# Patient Record
Sex: FEMALE | Race: WHITE | Employment: OTHER | URBAN - METROPOLITAN AREA
[De-identification: names, ages, dates, MRNs, and addresses within clinical notes are randomized per-mention and may not be internally consistent; named-entity substitution may affect disease eponyms.]

---

## 2017-03-08 PROBLEM — Z95.0 S/P PLACEMENT OF CARDIAC PACEMAKER: Status: ACTIVE | Noted: 2017-03-08

## 2017-03-28 ENCOUNTER — HOSPITAL ENCOUNTER (EMERGENCY)
Age: 82
Discharge: HOME | DRG: 379 | End: 2017-03-28
Attending: EMERGENCY MEDICINE
Payer: MEDICARE

## 2017-03-28 VITALS
BODY MASS INDEX: 21.22 KG/M2 | SYSTOLIC BLOOD PRESSURE: 103 MMHG | TEMPERATURE: 99.3 F | HEIGHT: 68 IN | DIASTOLIC BLOOD PRESSURE: 51 MMHG | WEIGHT: 140 LBS | RESPIRATION RATE: 16 BRPM | HEART RATE: 78 BPM | OXYGEN SATURATION: 98 %

## 2017-03-28 DIAGNOSIS — R19.7 VOMITING AND DIARRHEA: Primary | ICD-10-CM

## 2017-03-28 DIAGNOSIS — R11.10 VOMITING AND DIARRHEA: Primary | ICD-10-CM

## 2017-03-28 LAB
ALBUMIN SERPL BCP-MCNC: 3.4 G/DL (ref 3.2–4.6)
ALBUMIN/GLOB SERPL: 1 {RATIO} (ref 1.2–3.5)
ALP SERPL-CCNC: 58 U/L (ref 50–136)
ALT SERPL-CCNC: 16 U/L (ref 12–65)
ANION GAP BLD CALC-SCNC: 7 MMOL/L (ref 7–16)
AST SERPL W P-5'-P-CCNC: 13 U/L (ref 15–37)
BACTERIA URNS QL MICRO: 0 /HPF
BASOPHILS # BLD AUTO: 0 K/UL (ref 0–0.2)
BASOPHILS # BLD: 0 % (ref 0–2)
BILIRUB SERPL-MCNC: 0.7 MG/DL (ref 0.2–1.1)
BUN SERPL-MCNC: 21 MG/DL (ref 8–23)
CALCIUM SERPL-MCNC: 8.3 MG/DL (ref 8.3–10.4)
CASTS URNS QL MICRO: 0 /LPF
CHLORIDE SERPL-SCNC: 108 MMOL/L (ref 98–107)
CO2 SERPL-SCNC: 28 MMOL/L (ref 21–32)
CREAT SERPL-MCNC: 1.63 MG/DL (ref 0.6–1)
CRYSTALS URNS QL MICRO: 0 /LPF
DIFFERENTIAL METHOD BLD: ABNORMAL
EOSINOPHIL # BLD: 0 K/UL (ref 0–0.8)
EOSINOPHIL NFR BLD: 0 % (ref 0.5–7.8)
EPI CELLS #/AREA URNS HPF: NORMAL /HPF
ERYTHROCYTE [DISTWIDTH] IN BLOOD BY AUTOMATED COUNT: 14.4 % (ref 11.9–14.6)
GLOBULIN SER CALC-MCNC: 3.4 G/DL (ref 2.3–3.5)
GLUCOSE SERPL-MCNC: 106 MG/DL (ref 65–100)
HCT VFR BLD AUTO: 34.5 % (ref 35.8–46.3)
HGB BLD-MCNC: 11.2 G/DL (ref 11.7–15.4)
IMM GRANULOCYTES # BLD: 0 K/UL (ref 0–0.5)
IMM GRANULOCYTES NFR BLD AUTO: 0.2 % (ref 0–5)
LIPASE SERPL-CCNC: 82 U/L (ref 73–393)
LYMPHOCYTES # BLD AUTO: 9 % (ref 13–44)
LYMPHOCYTES # BLD: 0.6 K/UL (ref 0.5–4.6)
MCH RBC QN AUTO: 28.4 PG (ref 26.1–32.9)
MCHC RBC AUTO-ENTMCNC: 32.5 G/DL (ref 31.4–35)
MCV RBC AUTO: 87.3 FL (ref 79.6–97.8)
MONOCYTES # BLD: 0.4 K/UL (ref 0.1–1.3)
MONOCYTES NFR BLD AUTO: 6 % (ref 4–12)
MUCOUS THREADS URNS QL MICRO: 0 /LPF
NEUTS SEG # BLD: 5.6 K/UL (ref 1.7–8.2)
NEUTS SEG NFR BLD AUTO: 85 % (ref 43–78)
OTHER OBSERVATIONS,UCOM: NORMAL
PLATELET # BLD AUTO: 180 K/UL (ref 150–450)
PMV BLD AUTO: 9.7 FL (ref 10.8–14.1)
POTASSIUM SERPL-SCNC: 3.9 MMOL/L (ref 3.5–5.1)
PROT SERPL-MCNC: 6.8 G/DL (ref 6.3–8.2)
RBC # BLD AUTO: 3.95 M/UL (ref 4.05–5.25)
RBC #/AREA URNS HPF: NORMAL /HPF
SODIUM SERPL-SCNC: 143 MMOL/L (ref 136–145)
WBC # BLD AUTO: 6.6 K/UL (ref 4.3–11.1)
WBC URNS QL MICRO: NORMAL /HPF

## 2017-03-28 RX ORDER — HYOSCYAMINE SULFATE 0.12 MG/1
0.25 TABLET SUBLINGUAL
Status: COMPLETED | OUTPATIENT
Start: 2017-03-28 | End: 2017-03-28

## 2017-03-28 RX ORDER — SODIUM CHLORIDE 9 MG/ML
125 INJECTION, SOLUTION INTRAVENOUS CONTINUOUS
Status: DISCONTINUED | OUTPATIENT
Start: 2017-03-28 | End: 2017-03-28 | Stop reason: HOSPADM

## 2017-03-28 RX ORDER — HYOSCYAMINE SULFATE 0.12 MG/1
0.12 TABLET SUBLINGUAL
Qty: 15 TAB | Refills: 0 | Status: SHIPPED | OUTPATIENT
Start: 2017-03-28 | End: 2017-07-28

## 2017-03-28 RX ORDER — ONDANSETRON 4 MG/1
4 TABLET, ORALLY DISINTEGRATING ORAL
Status: COMPLETED | OUTPATIENT
Start: 2017-03-28 | End: 2017-03-28

## 2017-03-28 RX ORDER — SODIUM CHLORIDE 0.9 % (FLUSH) 0.9 %
5-10 SYRINGE (ML) INJECTION EVERY 8 HOURS
Status: DISCONTINUED | OUTPATIENT
Start: 2017-03-28 | End: 2017-03-28 | Stop reason: HOSPADM

## 2017-03-28 RX ORDER — ONDANSETRON 4 MG/1
4 TABLET, ORALLY DISINTEGRATING ORAL
Qty: 6 TAB | Refills: 1 | Status: ON HOLD | OUTPATIENT
Start: 2017-03-28 | End: 2017-03-29

## 2017-03-28 RX ORDER — SODIUM CHLORIDE 0.9 % (FLUSH) 0.9 %
5-10 SYRINGE (ML) INJECTION AS NEEDED
Status: DISCONTINUED | OUTPATIENT
Start: 2017-03-28 | End: 2017-03-28 | Stop reason: HOSPADM

## 2017-03-28 RX ADMIN — SODIUM CHLORIDE 1000 ML: 900 INJECTION, SOLUTION INTRAVENOUS at 17:58

## 2017-03-28 RX ADMIN — ONDANSETRON 4 MG: 4 TABLET, ORALLY DISINTEGRATING ORAL at 17:58

## 2017-03-28 RX ADMIN — HYOSCYAMINE SULFATE 0.25 MG: 0.12 TABLET ORAL at 17:59

## 2017-03-28 NOTE — ED NOTES
Pt comes to ED complaining of N/V/D that began over night last evening and has continued through today.

## 2017-03-28 NOTE — ED TRIAGE NOTES
Patient states nausea vomiting diarrhea since this am. States temperature at home 101. Patient has had imodium at [address] and pepto x2 doses since 0900. States mid abdominal pain.

## 2017-03-29 ENCOUNTER — HOSPITAL ENCOUNTER (INPATIENT)
Age: 82
LOS: 2 days | Discharge: HOME | DRG: 379 | End: 2017-03-31
Attending: INTERNAL MEDICINE | Admitting: INTERNAL MEDICINE
Payer: MEDICARE

## 2017-03-29 PROBLEM — K92.2 GI BLEED: Status: ACTIVE | Noted: 2017-03-29

## 2017-03-29 LAB
ANION GAP BLD CALC-SCNC: 11 MMOL/L (ref 7–16)
BUN SERPL-MCNC: 29 MG/DL (ref 8–23)
CALCIUM SERPL-MCNC: 7.8 MG/DL (ref 8.3–10.4)
CHLORIDE SERPL-SCNC: 106 MMOL/L (ref 98–107)
CO2 SERPL-SCNC: 24 MMOL/L (ref 21–32)
CREAT SERPL-MCNC: 1.73 MG/DL (ref 0.6–1)
GLUCOSE SERPL-MCNC: 73 MG/DL (ref 65–100)
HCT VFR BLD AUTO: 33 % (ref 35.8–46.3)
HGB BLD-MCNC: 10.7 G/DL (ref 11.7–15.4)
POTASSIUM SERPL-SCNC: 3.6 MMOL/L (ref 3.5–5.1)
SODIUM SERPL-SCNC: 141 MMOL/L (ref 136–145)

## 2017-03-29 PROCEDURE — 74011250637 HC RX REV CODE- 250/637

## 2017-03-29 PROCEDURE — 85018 HEMOGLOBIN: CPT

## 2017-03-29 PROCEDURE — C9113 INJ PANTOPRAZOLE SODIUM, VIA: HCPCS

## 2017-03-29 PROCEDURE — 36415 COLL VENOUS BLD VENIPUNCTURE: CPT

## 2017-03-29 PROCEDURE — 80048 BASIC METABOLIC PNL TOTAL CA: CPT

## 2017-03-29 PROCEDURE — 74011250636 HC RX REV CODE- 250/636

## 2017-03-29 PROCEDURE — 65270000029 HC RM PRIVATE

## 2017-03-29 PROCEDURE — 74011000250 HC RX REV CODE- 250

## 2017-03-29 PROCEDURE — 99218 HC RM OBSERVATION: CPT

## 2017-03-29 RX ORDER — ACETAMINOPHEN 325 MG/1
325 TABLET ORAL
Status: DISCONTINUED | OUTPATIENT
Start: 2017-03-29 | End: 2017-03-31 | Stop reason: HOSPADM

## 2017-03-29 RX ORDER — LISINOPRIL 20 MG/1
20 TABLET ORAL DAILY
Status: DISCONTINUED | OUTPATIENT
Start: 2017-03-30 | End: 2017-03-31 | Stop reason: HOSPADM

## 2017-03-29 RX ORDER — ONDANSETRON 4 MG/1
4 TABLET, ORALLY DISINTEGRATING ORAL
Status: DISCONTINUED | OUTPATIENT
Start: 2017-03-29 | End: 2017-03-31 | Stop reason: HOSPADM

## 2017-03-29 RX ORDER — ATENOLOL 25 MG/1
50 TABLET ORAL DAILY
Status: DISCONTINUED | OUTPATIENT
Start: 2017-03-30 | End: 2017-03-31 | Stop reason: HOSPADM

## 2017-03-29 RX ORDER — FAMOTIDINE 20 MG/1
20 TABLET, FILM COATED ORAL DAILY
Status: DISCONTINUED | OUTPATIENT
Start: 2017-03-30 | End: 2017-03-29

## 2017-03-29 RX ORDER — POTASSIUM CHLORIDE 750 MG/1
10 TABLET, FILM COATED, EXTENDED RELEASE ORAL 2 TIMES DAILY
COMMUNITY
End: 2017-04-01

## 2017-03-29 RX ORDER — TRIAMTERENE AND HYDROCHLOROTHIAZIDE 37.5; 25 MG/1; MG/1
1 CAPSULE ORAL DAILY
COMMUNITY
End: 2017-03-31

## 2017-03-29 RX ORDER — LEVOTHYROXINE SODIUM 112 UG/1
112 TABLET ORAL
Status: DISCONTINUED | OUTPATIENT
Start: 2017-03-30 | End: 2017-03-31 | Stop reason: HOSPADM

## 2017-03-29 RX ORDER — MIRTAZAPINE 15 MG/1
7.5 TABLET, FILM COATED ORAL
Status: DISCONTINUED | OUTPATIENT
Start: 2017-03-29 | End: 2017-03-31 | Stop reason: HOSPADM

## 2017-03-29 RX ORDER — HYOSCYAMINE SULFATE 0.12 MG/1
0.12 TABLET SUBLINGUAL
Status: DISCONTINUED | OUTPATIENT
Start: 2017-03-29 | End: 2017-03-31 | Stop reason: HOSPADM

## 2017-03-29 RX ADMIN — SODIUM CHLORIDE 40 MG: 9 INJECTION INTRAMUSCULAR; INTRAVENOUS; SUBCUTANEOUS at 22:36

## 2017-03-29 RX ADMIN — MIRTAZAPINE 7.5 MG: 15 TABLET, FILM COATED ORAL at 22:36

## 2017-03-29 NOTE — PROGRESS NOTES
Spiritual Care Assessment/Progress Notes    Malik Ayon [id number]  [id number]    12/14/1926  80 y.o.  female    Patient Telephone Number: [phone] (home)   Christianity Affiliation: Roman Catholic   Language: English   Extended Emergency Contact Information  Primary Emergency Contact: Rob Parr Phone: [phone]  Relation: Other Relative  Secondary Emergency Contact: Rob Parr Phone: [phone]  Relation: Son   Patient Active Problem List    Diagnosis Date Noted    GI bleed 03/29/2017    S/P placement of cardiac pacemaker 03/08/2017    Slow transit constipation 12/20/2016    Vasomotor rhinitis 12/20/2016    History of TIA (transient ischemic attack) 12/20/2016    Nonrheumatic aortic valve stenosis 12/20/2016    Gait instability 12/20/2016    Hypothyroidism, adult     HTN (hypertension), benign     GERD (gastroesophageal reflux disease)     Paroxysmal atrial fibrillation ([facility] [address]         Date: 3/29/2017       Level of Christianity/Spiritual Activity:  [x]         Involved in geovanna tradition/spiritual practice    []         Not involved in geovanna tradition/spiritual practice  [x]         Spiritually oriented    []         Claims no spiritual orientation    []         seeking spiritual identity  []         Feels alienated from Sabianist practice/tradition  []         Feels angry about Sabianist practice/tradition  [x]         Spirituality/Sabianist tradition is a resource for coping at this time.   []         Not able to assess due to medical condition    Services Provided Today:  []         crisis intervention    [x]         reading Scriptures  []         spiritual assessment    [x]         prayer  [x]         empathic listening/emotional support  []         rites and rituals (cite in comments)  []         life review     []         Sabianist support  []         theological development   []         advocacy  []         ethical dialog     []         blessing  []         bereavement support    [x]         support to family  []         anticipatory grief support   []         help with AMD  []         spiritual guidance    []         meditation      Spiritual Care Needs  [x]         Emotional Support  [x]         Spiritual/Buddhist Care  []         Loss/Adjustment  []         Advocacy/Referral                /Ethics  []         No needs expressed at               this time  []         Other: (note in               comments)  [address]  [x]         Follow up visits with               pt/family  []         Provide materials  []         Schedule sacraments  []         Contact Community               Clergy  []         Follow up as needed  []         Other: (note in               comments)     Comments: Patient doing well; son and daughter-in-law tearful, but coping appropriately. Family is Jewish and request Nikole Robert for [employer]. Support, including prayer, provided.     Purvi Zambrano

## 2017-03-29 NOTE — CONSULTS
[facility] Consult Note       Referring Physician:  Dr Jaki Bruce Date:  3/29/2017    Admit Date:  3/29/2017    Chief Complaint:  Melena, anemia    Subjective:     History of Present Illness:  Patient is a 80 y.o. female with PMH of GERD, HTN, A fib not on anti-coagulation secondary to GI bleed about a year ago, who is seen in consultation at the request of Dr. Monroe Blake for anemia in setting of guaiac positive dark stool. Most of the history obtained from son- Tennille Client at bedside. Ms Rupesh Hopkins presents with 2 days of nausea and bilious vomiting followed by 3-4 episodes of dark liquid BMs today. She was seen in the ER yesterday and given IVF and discharged. Today she saw her PMD and was sent for admission secondary to melena on exam.  She denies NSAIDs, aspirin use. She used to be on coumadin but a year ago she had significant obscure GI bleed that required blood tranfusions and EGD/[place] did not reveal source of bleeding. She never had a capsule endoscopy and coumadin was discontinued. After that hopsitalizaiton she had significant deconditioning and was on hospice for several months. She continued to improve and came off hospice late 2016. She now lives in [place] with her son and daughter-in-law. She has had decreased appetite for some time but denies significant abdominal pain. She has mild bilateral lower quadrant pain today.       PMH:  Past Medical History:   Diagnosis Date    Arrhythmia     Gait instability 12/20/2016    GERD (gastroesophageal reflux disease)     History of TIA (transient ischemic attack) 12/20/2016    HTN (hypertension), benign     Hypothyroidism, adult     Nonrheumatic aortic valve stenosis 12/20/2016    Paroxysmal atrial fibrillation (HCC)     S/P placement of cardiac pacemaker 3/8/2017    Slow transit constipation 12/20/2016    Vasomotor rhinitis 12/20/2016       PSH:  Past Surgical History:   Procedure Laterality Date    CARDIAC SURG PROCEDURE UNLIST      pace maker    HX APPENDECTOMY      HX CHOLECYSTECTOMY      HX HEENT      thyroid       Allergies: Allergies   Allergen Reactions    Pcn [Penicillins] Anaphylaxis    Sulfa (Sulfonamide Antibiotics) Anaphylaxis       Home Medications:  Prior to Admission medications    Medication Sig Start Date End Date Taking? Authorizing Provider   triamterene-hydroCHLOROthiazide (DYAZIDE) 37.5-25 mg per capsule Take 1 Cap by mouth daily. Yes Historical Provider   potassium chloride SR (KLOR-CON 10) 10 mEq tablet Take 10 mEq by mouth two (2) times a day. Historical Provider   hyoscyamine SL (LEVSIN/SL) 0.125 mg SL tablet 1 Tab by SubLINGual route every four (4) hours as needed for Cramping. 3/28/17   David Greenberg MD   atenolol (TENORMIN) 50 mg tablet Take  by mouth daily. Historical Provider   levothyroxine (SYNTHROID) 112 mcg tablet Take  by mouth Daily (before breakfast). Historical Provider   lisinopril (PRINIVIL, ZESTRIL) 20 mg tablet Take  by mouth daily. Historical Provider   senna (SENNA) 8.6 mg tablet Take 1 Tab by mouth daily. Historical Provider   raNITIdine (ZANTAC) 150 mg tablet Take 150 mg by mouth two (2) times a day. Historical Provider   acetaminophen (TYLENOL) 325 mg tablet Take  by mouth every four (4) hours as needed for Pain.     Historical Provider       Hospital Medications:  Current Facility-Administered Medications   Medication Dose Route Frequency    hyoscyamine SL (LEVSIN/SL) tablet 0.125 mg  0.125 mg SubLINGual Q4H PRN    ondansetron (ZOFRAN ODT) tablet 4 mg  4 mg Oral Q8H PRN    mirtazapine (REMERON) tablet 7.5 mg  7.5 mg Oral QHS    acetaminophen (TYLENOL) tablet 325 mg  325 mg Oral Q4H PRN    [START ON 3/30/2017] atenolol (TENORMIN) tablet 50 mg  50 mg Oral DAILY    [START ON 3/30/2017] levothyroxine (SYNTHROID) tablet 112 mcg  112 mcg Oral ACB    [START ON 3/30/2017] lisinopril (PRINIVIL, ZESTRIL) tablet 20 mg  20 mg Oral DAILY    [START ON 3/30/2017] famotidine (PEPCID) tablet 20 mg  20 mg Oral DAILY    pantoprazole (PROTONIX) 40 mg in sodium chloride 0.9 % 10 mL injection  40 mg IntraVENous Q12H       Social History:  Social History   Substance Use Topics    Smoking status: Never Smoker    Smokeless tobacco: Never Used    Alcohol use Yes      Comment: occ. Pt denies any history of IV drug use, blood transfusions, or tattoos. Family History:  No family history on file.   + Fhx of CRC in brother and son (doing well after surgery)    Review of Systems:  A detailed 10 system ROS is obtained, with pertinent positives as listed above. All others are negative. Diet: NPO      Objective:     Physical Exam:  Vitals:  Visit Vitals    /54 (BP Patient Position: Standing)    Pulse 69    Temp 98.9 °F (37.2 °C)    Resp 18    SpO2 96%     Gen:  Pt is alert, cooperative, no acute distress - elderly  Skin:  Extremities and face reveal no rashes. No bermeo erythema. No telangiectasias on the chest wall. HEENT: Sclerae anicteric. Extra-occular muscles are intact. No oral ulcers. No abnormal pigmentation of the lips. The neck is supple. Cardiovascular: Regular rate and rhythm. No murmurs, gallops, or rubs. Respiratory:  Comfortable breathing with no accessory muscle use. Clear breath sounds anteriorly with no wheezes, rales, or rhonchi. GI:  Abdomen nondistended, soft, and mild tenderness to palpation in b/l lower quadrant. Normal active bowel sounds. No enlargement of the liver or spleen. No masses palpable. Rectal:  Deferred  Musculoskeletal:  No pitting edema of the lower legs. Extremities have good range of motion. No costovertebral tenderness. Neurological:  Gross memory appears intact. Patient is alert and oriented. Psychiatric:  Mood appears appropriate with judgement intact. Lymphatic:  No cervical or supraclavicular adenopathy.     Laboratory:    Recent Labs      03/29/17   1510  03/28/17   1530   WBC   --   6.6   HGB 10.7*  11.2*   HCT  33.0*  34.5*   PLT   --   180   MCV   --   87.3   NA  141  143   K  3.6  3.9   CL  106  108*   CO2  24  28   BUN  29*  21   CREA  1.73*  1.63*   CA  7.8*  8.3   GLU  73  106*   AP   --   58   SGOT   --   13*   ALT   --   16   TBILI   --   0.7   ALB   --   3.4   TP   --   6.8   LPSE   --   82          Assessment:     Principal Problem:    GI bleed (3/29/2017)        Plan:   80 yr old female with GERD, hypothyroidism, HTN, TIA, A fib admitted from PCP for guaiac positive stool in the setting of melena which could be secondary to PUD, esophagitis, as well as malignancy. The latter is less likely given she had EGD/[place] about a year ago in [place]. Differential diagnosis also includes right sided diverticular bleed as well as infectious gastroenteritis and SB AVMs. Hb stable but decreased from yesterday. Agree with H/H q and transfuse if Hb <9 given age and cardiac history  PPI IV twice daily  EGD in am - IRBA d/w patient and son and they are agreeable.         Esme Jose MD  [facility]

## 2017-03-29 NOTE — ED PROVIDER NOTES
HPI Comments: 77-year-old lady with a history of vomiting and diarrhea that started last night. Patient had apparently been doing well yesterday and then late last night she began having some nonbloody nonbilious emesis and nonbloody diarrhea. She said she did feel a little dehydrated and fatigued so the family gave her some Imodium which did not seem to help. Patient said she is not having any abdominal pain with this and has had no chest pain or shortness of breath. Patient said that she does have some muscle achiness that she describes as a 6 out of 10. She says nothing has really made her symptoms any worse. Elements of this note were created using speech recognition software. As such, errors of speech recognition may be present. Patient is a 80 y.o. female presenting with abdominal pain. The history is provided by the patient. Abdominal Pain    Associated symptoms include diarrhea, nausea and vomiting. Pertinent negatives include no fever, no dysuria, no hematuria, no headaches, no arthralgias, no myalgias and no chest pain. Past Medical History:   Diagnosis Date    Arrhythmia     Gait instability 12/20/2016    GERD (gastroesophageal reflux disease)     History of TIA (transient ischemic attack) 12/20/2016    HTN (hypertension), benign     Hypothyroidism, adult     Nonrheumatic aortic valve stenosis 12/20/2016    Paroxysmal atrial fibrillation (HCC)     S/P placement of cardiac pacemaker 3/8/2017    Slow transit constipation 12/20/2016    Vasomotor rhinitis 12/20/2016       Past Surgical History:   Procedure Laterality Date    CARDIAC SURG PROCEDURE UNLIST      pace maker    HX APPENDECTOMY      HX CHOLECYSTECTOMY      HX HEENT      thyroid         History reviewed. No pertinent family history. Social History     Social History    Marital status:      Spouse name: N/A    Number of children: N/A    Years of education: N/A     Occupational History    Not on file. Social History Main Topics    Smoking status: Never Smoker    Smokeless tobacco: Never Used    Alcohol use Yes      Comment: occ.  Drug use: Not on file    Sexual activity: Not on file     Other Topics Concern    Not on file     Social History Narrative         ALLERGIES: Pcn [penicillins] and Sulfa (sulfonamide antibiotics)    Review of Systems   Constitutional: Negative for chills, diaphoresis and fever. HENT: Negative for congestion, rhinorrhea and sore throat. Eyes: Negative for redness and visual disturbance. Respiratory: Negative for cough, chest tightness, shortness of breath and wheezing. Cardiovascular: Negative for chest pain and palpitations. Gastrointestinal: Positive for diarrhea, nausea and vomiting. Negative for abdominal pain and blood in stool. Endocrine: Negative for polydipsia and polyuria. Genitourinary: Negative for dysuria and hematuria. Musculoskeletal: Negative for arthralgias, myalgias and neck stiffness. Skin: Negative for rash. Allergic/Immunologic: Negative for environmental allergies and food allergies. Neurological: Negative for dizziness, weakness and headaches. Hematological: Negative for adenopathy. Does not bruise/bleed easily. Psychiatric/Behavioral: Negative for confusion and sleep disturbance. The patient is not nervous/anxious. Vitals:    03/28/17 1531 03/28/17 1712 03/28/17 1832 03/28/17 1833   BP: 107/53   103/51   Pulse: 78      Resp: 16      Temp: 99.3 °F (37.4 °C)      SpO2: 98% 95% 98% 98%   Weight: 63.5 kg (140 lb)      Height: 5' 8\" (1.727 m)               Physical Exam   Constitutional: She is oriented to person, place, and time. She appears well-developed and well-nourished. HENT:   Head: Normocephalic and atraumatic. Eyes: Conjunctivae and EOM are normal. Pupils are equal, round, and reactive to light. Neck: Normal range of motion. Cardiovascular: Normal rate and regular rhythm. Murmur heard.   3/6 systolic ejection murmur   Pulmonary/Chest: Effort normal and breath sounds normal. No respiratory distress. She has no wheezes. She has no rales. She exhibits no tenderness. Abdominal: Soft. Bowel sounds are normal. There is no rebound and no guarding. Musculoskeletal: Normal range of motion. She exhibits no edema or tenderness. Lymphadenopathy:     She has no cervical adenopathy. Neurological: She is alert and oriented to person, place, and time. Skin: Skin is warm and dry. Psychiatric: She has a normal mood and affect. Nursing note and vitals reviewed. MDM  Number of Diagnoses or Management Options  Vomiting and diarrhea:   Diagnosis management comments: The patient's blood work is essentially unremarkable with no significant electrolyte abnormality and her creatinine is improved from what it was a couple weeks ago. At this time I do not think she needs admission for anything emergent. We did hydrate her and I will plan to discharge her home with some medicines to hopefully help her symptoms.     ED Course       Procedures

## 2017-03-29 NOTE — PROGRESS NOTES
Pt admitted to floor and oriented to unit. Pt is alert and oriented x 3 complaining of cramping in abdomen. Pt is on room air. Mallika REAL placed 20 g IV. Dual skin assessment with Gladys REAL. Pt has no skin breakdown. Skin in fragile. Hospitalist aware that pt has arrived.  Awaiting orders

## 2017-03-29 NOTE — H&P
HOSPITALIST H&P  NAME:  Shin Martinez   Age:  80 y.o.  :   1926   MRN:   [id number]  PCP: Navid Harkins DO  Treatment Team: Attending Provider: Demond Davis MD    HPI:   Shin Martinez is a 80 y.o. female that presents as direct admission to the hospitalist service from [facility] office due to GI bleed/melena with grossly heme positive stool. She reports 2 day history of melena, N/V associated with abdominal cramps. Emesis described as green \"bile like\" without hematemesis or coffee ground emesis. She was seen in the ED yesterday with Hgb of 11.2, she was given IVF and discharged home. Complete ROS done and is as stated in HPI or otherwise negative  Past Medical History:   Diagnosis Date    Arrhythmia     Gait instability 2016    GERD (gastroesophageal reflux disease)     History of TIA (transient ischemic attack) 2016    HTN (hypertension), benign     Hypothyroidism, adult     Nonrheumatic aortic valve stenosis 2016    Paroxysmal atrial fibrillation (HCC)     S/P placement of cardiac pacemaker 3/8/2017    Slow transit constipation 2016    Vasomotor rhinitis 2016      Past Surgical History:   Procedure Laterality Date    CARDIAC SURG PROCEDURE UNLIST      pace maker    HX APPENDECTOMY      HX CHOLECYSTECTOMY      HX HEENT      thyroid      Prior to Admission Medications   Prescriptions Last Dose Informant Patient Reported? Taking? HYDROcodone-acetaminophen (NORCO) 5-325 mg per tablet   Yes No   Sig: Take 1 Tab by mouth nightly. acetaminophen (TYLENOL) 325 mg tablet   Yes No   Sig: Take  by mouth every four (4) hours as needed for Pain. atenolol (TENORMIN) 50 mg tablet   Yes No   Sig: Take  by mouth daily. hyoscyamine SL (LEVSIN/SL) 0.125 mg SL tablet   No No   Si Tab by SubLINGual route every four (4) hours as needed for Cramping.    levothyroxine (SYNTHROID) 112 mcg tablet   Yes No   Sig: Take  by mouth Daily (before breakfast). lisinopril (PRINIVIL, ZESTRIL) 20 mg tablet   Yes No   Sig: Take  by mouth daily. mirtazapine (REMERON) 7.5 mg tablet   No No   Sig: Take 1 Tab by mouth nightly. ondansetron (ZOFRAN ODT) 4 mg disintegrating tablet   No No   Sig: Take 1 Tab by mouth every eight (8) hours as needed for Nausea for up to 2 days. raNITIdine (ZANTAC) 150 mg tablet   Yes No   Sig: Take 150 mg by mouth two (2) times a day. senna (SENNA) 8.6 mg tablet   Yes No   Sig: Take 1 Tab by mouth daily. Facility-Administered Medications: None     Allergies   Allergen Reactions    Pcn [Penicillins] Anaphylaxis    Sulfa (Sulfonamide Antibiotics) Anaphylaxis      Social History   Substance Use Topics    Smoking status: Never Smoker    Smokeless tobacco: Never Used    Alcohol use Yes      Comment: occ. Objective:     Visit Vitals    /60    Pulse 79    Temp 98.9 °F (37.2 °C)    Resp 18    SpO2 96%      Temp (24hrs), Av.9 °F (37.2 °C), Min:98.9 °F (37.2 °C), Max:98.9 °F (37.2 °C)    Oxygen Therapy  O2 Sat (%): 96 % (17 1548)  O2 Device: Room air (17 1507)  Physical Exam:  General:    Alert, cooperative, no distress, appears stated age. Head:   Normocephalic, without obvious abnormality, atraumatic. Nose:  Nares normal. No drainage or sinus tenderness. Lungs:   CTA  Heart:  RRR, TINO  Abdomen:   Soft, non-tender. Not distended. Bowel sounds normal. No masses  Extremities: No cyanosis. No edema. No clubbing  Skin:     Texture, turgor normal. No rashes or lesions.   Not Jaundiced  Neurologic: Alert and oriented times 3, non-focal   Data Review:   Recent Results (from the past 24 hour(s))   URINE MICROSCOPIC    Collection Time: 17  5:11 PM   Result Value Ref Range    WBC 10-20 0 /hpf    RBC 3-5 0 /hpf    Epithelial cells 5-10 0 /hpf    Bacteria 0 0 /hpf    Casts 0 0 /lpf    Crystals 0 0 /LPF    Mucus 0 0 /lpf    Other observations RESULTS VERIFIED MANUALLY         Assessment and Plan: Active Hospital Problems    Diagnosis Date Noted    GI bleed 03/29/2017   Admit to medical bed for observation   IV PPI  Monitor Hgb every 8 hours and transfuse as needed  Continue home medications as ordered   Surrogate decision maker: son, Rick Will  Estimated length of stay:1-2 days   Zac Cantrell., PA

## 2017-03-30 LAB
HCT VFR BLD AUTO: 30 % (ref 35.8–46.3)
HCT VFR BLD AUTO: 30.9 % (ref 35.8–46.3)
HCT VFR BLD AUTO: 34.6 % (ref 35.8–46.3)
HGB BLD-MCNC: 11.2 G/DL (ref 11.7–15.4)
HGB BLD-MCNC: 9.6 G/DL (ref 11.7–15.4)
HGB BLD-MCNC: 9.9 G/DL (ref 11.7–15.4)

## 2017-03-30 PROCEDURE — 74011000250 HC RX REV CODE- 250

## 2017-03-30 PROCEDURE — 85018 HEMOGLOBIN: CPT

## 2017-03-30 PROCEDURE — C9113 INJ PANTOPRAZOLE SODIUM, VIA: HCPCS

## 2017-03-30 PROCEDURE — 74011250637 HC RX REV CODE- 250/637

## 2017-03-30 PROCEDURE — 36415 COLL VENOUS BLD VENIPUNCTURE: CPT

## 2017-03-30 PROCEDURE — 65270000029 HC RM PRIVATE

## 2017-03-30 PROCEDURE — 99218 HC RM OBSERVATION: CPT

## 2017-03-30 PROCEDURE — 74011250636 HC RX REV CODE- 250/636

## 2017-03-30 RX ADMIN — LEVOTHYROXINE SODIUM 112 MCG: 112 TABLET ORAL at 05:39

## 2017-03-30 RX ADMIN — SODIUM CHLORIDE 40 MG: 9 INJECTION INTRAMUSCULAR; INTRAVENOUS; SUBCUTANEOUS at 19:46

## 2017-03-30 RX ADMIN — LISINOPRIL 20 MG: 20 TABLET ORAL at 08:17

## 2017-03-30 RX ADMIN — SODIUM CHLORIDE 40 MG: 9 INJECTION INTRAMUSCULAR; INTRAVENOUS; SUBCUTANEOUS at 08:17

## 2017-03-30 RX ADMIN — MIRTAZAPINE 7.5 MG: 15 TABLET, FILM COATED ORAL at 19:46

## 2017-03-30 RX ADMIN — ATENOLOL 50 MG: 25 TABLET ORAL at 08:17

## 2017-03-30 NOTE — PROGRESS NOTES
No evidence of n/v or bloody stools today. Pt tolerated gi soft diet today.  Will continue to monitor

## 2017-03-30 NOTE — INTERDISCIPLINARY ROUNDS
Interdisciplinary team rounds were held 3/30/2017 with the following team members:Care Management, Nursing, Pastoral Care, Pharmacy and Physician. Plan of care discussed. See clinical pathway and/or care plan for interventions and desired outcomes. Anticipating discharge to home with home health tomorrow.

## 2017-03-30 NOTE — PROGRESS NOTES
Hospitalist Progress Note    3/30/2017  Admit Date: 3/29/2017  9:98 PM   NAME: Ofelia Worley   :  1926   MRN:  [id number]   Attending: Nargis Torres MD  PCP:  Alex Weber DO    SUBJECTIVE:     Ofelia Worley is a 80yoF admitted yesterday for GIB/melena x 2 days. No BMs since admission. Hgb relatively stable. Patient does not want to pursue EGD unless absolutely necessary. She denies abd pain and nausea. Reports that she just want to eat because shes hungry. Review of Systems negative with exception of pertinent positives noted above      PHYSICAL EXAM       Visit Vitals    /61    Pulse 72    Temp 97.5 °F (36.4 °C)    Resp 20    SpO2 94%      Temp (24hrs), Av.2 °F (36.8 °C), Min:97.5 °F (36.4 °C), Max:98.9 °F (37.2 °C)    Oxygen Therapy  O2 Sat (%): 94 % (17 1224)  O2 Device: Room air (17 0711)    Intake/Output Summary (Last 24 hours) at 17 1239  Last data filed at 17 1223   Gross per 24 hour   Intake              240 ml   Output                0 ml   Net              240 ml      General: No acute distress. Head:  Atraumatic Normocephalic. Eyes:  Anicteric. Lungs:  CTA Bilaterally. CVS:  RRR  Abdomen: Soft, NTTP, +BS  MSK:  Spontaneously moves extremities. Neurologic:  AOx3.  No focal deficits    Recent Results (from the past 24 hour(s))   HGB & HCT    Collection Time: 17  3:10 PM   Result Value Ref Range    HGB 10.7 (L) 11.7 - 15.4 g/dL    HCT 33.0 (L) 35.8 - 05.1 %   METABOLIC PANEL, BASIC    Collection Time: 17  3:10 PM   Result Value Ref Range    Sodium 141 136 - 145 mmol/L    Potassium 3.6 3.5 - 5.1 mmol/L    Chloride 106 98 - 107 mmol/L    CO2 24 21 - 32 mmol/L    Anion gap 11 7 - 16 mmol/L    Glucose 73 65 - 100 mg/dL    BUN 29 (H) 8 - 23 MG/DL    Creatinine 1.73 (H) 0.6 - 1.0 MG/DL    GFR est AA 36 (L) >60 ml/min/1.73m2    GFR est non-AA 29 (L) >60 ml/min/1.73m2    Calcium 7.8 (L) 8.3 - 10.4 MG/DL   HGB & HCT Collection Time: 03/30/17  2:30 AM   Result Value Ref Range    HGB 9.6 (L) 11.7 - 15.4 g/dL    HCT 30.0 (L) 35.8 - 46.3 %   HGB & HCT    Collection Time: 03/30/17  9:06 AM   Result Value Ref Range    HGB 9.9 (L) 11.7 - 15.4 g/dL    HCT 30.9 (L) 35.8 - 46.3 %         Imaging /Procedures /Studies   No orders to display         [address] Problems as of 3/30/2017  Date Reviewed: 3/29/2017          Codes Class Noted - Resolved POA    * (Principal)GI bleed ICD-10-CM: K92.2  ICD-9-CM: 578.9  3/29/2017 - Present Unknown                  Plan:  - UGIB: no further episodes of melena. Will continue to monitor Hgb closely today. If stable, will discharge home tomorrow. Spoke with GI who is in agreement with this plan. If bleeding worsens, will need to revisit possibility of procedure with patient and family.      DVT Prophylaxis: SCDs  Dispo: has [place] set up, so likely home with Anayeli Damico MD

## 2017-03-30 NOTE — PROGRESS NOTES
Visit  At request of family, TANA Cifuentes,Director of Spiritual Care, visited pt to give her the [place]. Chaplains/ to follow-up, if desired, to provide ongoing spiritual care during hospitalization.     Fabrice Ramirez MDiv, [address] [place] [address]

## 2017-03-30 NOTE — PROGRESS NOTES
GI DAILY PROGRESS NOTE    Admit Date:  3/29/2017    Today's Date:  3/30/2017    CC:  Melena, drop in HGB    Subjective:     Patient with son at bedside. Plan was for EGD this morning. However, this morning, Mrs. Josué Stevens decided that she did not want to have an EGD. She states that \"90 years is a long time to live\". Per her son at the bedside, Mrs. Josué Stevens was in Hospice until her move from [place] in December. Her last episode of melena was yesterday. She denies any abdominal pain, nausea, or vomiting. She wants to eat breakfast.     Medications:   Current Facility-Administered Medications   Medication Dose Route Frequency    hyoscyamine SL (LEVSIN/SL) tablet 0.125 mg  0.125 mg SubLINGual Q4H PRN    ondansetron (ZOFRAN ODT) tablet 4 mg  4 mg Oral Q8H PRN    mirtazapine (REMERON) tablet 7.5 mg  7.5 mg Oral QHS    acetaminophen (TYLENOL) tablet 325 mg  325 mg Oral Q4H PRN    atenolol (TENORMIN) tablet 50 mg  50 mg Oral DAILY    levothyroxine (SYNTHROID) tablet 112 mcg  112 mcg Oral ACB    lisinopril (PRINIVIL, ZESTRIL) tablet 20 mg  20 mg Oral DAILY    pantoprazole (PROTONIX) 40 mg in sodium chloride 0.9 % 10 mL injection  40 mg IntraVENous Q12H       Review of Systems:  ROS was obtained, with pertinent positives as listed above. No chest pain or SOB. Diet:  NPO    Objective:   Vitals:  Visit Vitals    /65    Pulse 74    Temp 98.2 °F (36.8 °C)    Resp 15    SpO2 94%     Intake/Output:        Exam:  General appearance: alert, cooperative, no distress  Lungs: clear to auscultation bilaterally anteriorly  Heart: regular rate and rhythm  Abdomen: soft, NON-TENDER.  Bowel sounds normal. No masses, no organomegaly  Extremities: extremities normal, atraumatic, no cyanosis or edema  Neuro:  alert and oriented    Data Review (Labs):    Recent Labs      03/30/17   0230  03/29/17   1510  03/28/17   1530   WBC   --    --   6.6   HGB  9.6*  10.7*  11.2*   HCT  30.0*  33.0*  34.5*   PLT   --    -- 180   MCV   --    --   87.3   NA   --   141  143   K   --   3.6  3.9   CL   --   106  108*   CO2   --   24  28   BUN   --   29*  21   CREA   --   1.73*  1.63*   CA   --   7.8*  8.3   GLU   --   73  106*   AP   --    --   58   SGOT   --    --   13*   ALT   --    --   16   TBILI   --    --   0.7   ALB   --    --   3.4   TP   --    --   6.8   LPSE   --    --   82       Assessment:     Principal Problem:    GI bleed (3/29/2017)      This is a 79 yo female with history of GERD, Hypothyroidism, HTN, Afib admitted from PCP office for guaiac positive stool in the setting of melena. 3/30/17: HGB dropped from 10.7 to 9.6. Plan:   1. Follow HGB and transfuse as needed  2. PPI BID  3. Cancel EGD as patient voices her wishes to not have an endoscopy. Discussed with patient and son that if she had an upper source that we could potentially intervene with EGD to help stop the bleeding. They voiced understanding and still do not want to proceed. 4. Will resume diet    Jaleesa Rivera NP    Patient is seen and examined in collaboration with Dr. Azra Viera.   Assessment and plan as per Dr. Azra Viera

## 2017-03-30 NOTE — PROGRESS NOTES
Pt and son state they do not want egd today. Informed hospitalist Dr. Saravanan Styles and GI NP Morenita Hyatt.  They are aware and state they will talk to family

## 2017-03-30 NOTE — PROGRESS NOTES
When giving morning medication and doing rounds, patient and son stated that they discussed it together and that they did not wish to have the EGD done this morning. Passed on to first shift nurse.

## 2017-03-31 ENCOUNTER — HOME HEALTH ADMISSION (OUTPATIENT)
Dept: HOME HEALTH SERVICES | Facility: HOME HEALTH | Age: 82
End: 2017-03-31
Payer: MEDICARE

## 2017-03-31 VITALS
TEMPERATURE: 98.2 F | DIASTOLIC BLOOD PRESSURE: 64 MMHG | HEART RATE: 68 BPM | OXYGEN SATURATION: 98 % | RESPIRATION RATE: 15 BRPM | SYSTOLIC BLOOD PRESSURE: 129 MMHG

## 2017-03-31 PROBLEM — K57.91 DIVERTICULOSIS OF INTESTINE WITH BLEEDING: Status: ACTIVE | Noted: 2017-03-31

## 2017-03-31 LAB
HCT VFR BLD AUTO: 29.6 % (ref 35.8–46.3)
HCT VFR BLD AUTO: 31.8 % (ref 35.8–46.3)
HGB BLD-MCNC: 10.2 G/DL (ref 11.7–15.4)
HGB BLD-MCNC: 9.5 G/DL (ref 11.7–15.4)

## 2017-03-31 PROCEDURE — 74011000250 HC RX REV CODE- 250

## 2017-03-31 PROCEDURE — 74011250637 HC RX REV CODE- 250/637

## 2017-03-31 PROCEDURE — 85018 HEMOGLOBIN: CPT | Performed by: INTERNAL MEDICINE

## 2017-03-31 PROCEDURE — 36415 COLL VENOUS BLD VENIPUNCTURE: CPT | Performed by: INTERNAL MEDICINE

## 2017-03-31 PROCEDURE — 74011250636 HC RX REV CODE- 250/636

## 2017-03-31 PROCEDURE — C9113 INJ PANTOPRAZOLE SODIUM, VIA: HCPCS

## 2017-03-31 RX ADMIN — SODIUM CHLORIDE 40 MG: 9 INJECTION INTRAMUSCULAR; INTRAVENOUS; SUBCUTANEOUS at 08:07

## 2017-03-31 RX ADMIN — LISINOPRIL 20 MG: 20 TABLET ORAL at 08:07

## 2017-03-31 RX ADMIN — LEVOTHYROXINE SODIUM 112 MCG: 112 TABLET ORAL at 05:52

## 2017-03-31 RX ADMIN — ATENOLOL 50 MG: 25 TABLET ORAL at 08:07

## 2017-03-31 NOTE — PROGRESS NOTES
Care Management Interventions  PCP Verified by CM: Yes  Transition of Care Consult (CM Consult): [address]: Yes  Physical Therapy Consult: Yes  Current Support Network: Lives with Caregiver (Lives with son and daughter in law. )  Confirm Follow Up Transport: Family  Plan discussed with Pt/Family/Caregiver: Yes  Freedom of Choice Offered: Yes  Discharge Location  Discharge Placement: Home     Met with patient and son and daughter in law. Patient is alert and oriented to situation. She appears to understand but does defer to her son when she answers questions as if she was checking to make sure she gave the correct answers. Patient ambulates with a cane and has a chair lift to manage the stairs in the home. She has the services of [employer] and the family had initiated contact with [facility] for their services. Son signed a release of information so that I was able to share info with rep Torin Eduardo of [facility] ([phone])    Plan: Await next hgb result and probable dc to home. Offered PT at home and patient and son want to think about it. Juan Petersen

## 2017-03-31 NOTE — DISCHARGE INSTRUCTIONS
DISCHARGE SUMMARY from Nurse    The following personal items are in your possession at time of discharge:       Visual Aid: Glasses     Home Medications: None  Jewelry: None  Clothing: Footwear, Pants, Shirt, Socks, Undergarments  Other Valuables: At bedside, Cane             PATIENT INSTRUCTIONS:    After general anesthesia or intravenous sedation, for 24 hours or while taking prescription Narcotics:  · Limit your activities  · Do not drive and operate hazardous machinery  · Do not make important personal or business decisions  · Do  not drink alcoholic beverages  · If you have not urinated within 8 hours after discharge, please contact your surgeon on call. Report the following to your surgeon:  · Excessive pain, swelling, redness or odor of or around the surgical area  · Temperature over 100.5  · Nausea and vomiting lasting longer than 4 hours or if unable to take medications  · Any signs of decreased circulation or nerve impairment to extremity: change in color, persistent  numbness, tingling, coldness or increase pain  · Any questions        What to do at Home:  Recommended activity: Activity as tolerated, cardiac diet    If you experience any of the following symptoms blood in stool, vomiting blood, extreme dizziness/fatigue, or shortness of breath, please follow up with your primary care physician. *  Please give a list of your current medications to your Primary Care Provider. *  Please update this list whenever your medications are discontinued, doses are      changed, or new medications (including over-the-counter products) are added. *  Please carry medication information at all times in case of emergency situations. These are general instructions for a healthy lifestyle:    No smoking/ No tobacco products/ Avoid exposure to second hand smoke    Surgeon General's Warning:  Quitting smoking now greatly reduces serious risk to your health.     Obesity, smoking, and sedentary lifestyle greatly increases your risk for illness    A healthy diet, regular physical exercise & weight monitoring are important for maintaining a healthy lifestyle    You may be retaining fluid if you have a history of heart failure or if you experience any of the following symptoms:  Weight gain of 3 pounds or more overnight or 5 pounds in a week, increased swelling in our hands or feet or shortness of breath while lying flat in bed. Please call your doctor as soon as you notice any of these symptoms; do not wait until your next office visit. Recognize signs and symptoms of STROKE:    F-face looks uneven    A-arms unable to move or move unevenly    S-speech slurred or non-existent    T-time-call 911 as soon as signs and symptoms begin-DO NOT go       Back to bed or wait to see if you get better-TIME IS BRAIN. Warning Signs of HEART ATTACK     Call 911 if you have these symptoms:   Chest discomfort. Most heart attacks involve discomfort in the center of the chest that lasts more than a few minutes, or that goes away and comes back. It can feel like uncomfortable pressure, squeezing, fullness, or pain.  Discomfort in other areas of the upper body. Symptoms can include pain or discomfort in one or both arms, the back, neck, jaw, or stomach.  Shortness of breath with or without chest discomfort.  Other signs may include breaking out in a cold sweat, nausea, or lightheadedness. Don't wait more than five minutes to call 911 - MINUTES MATTER! Fast action can save your life. Calling 911 is almost always the fastest way to get lifesaving treatment. Emergency Medical Services staff can begin treatment when they arrive -- up to an hour sooner than if someone gets to the hospital by car. The discharge information has been reviewed with the patient. The patient verbalized understanding.     Discharge medications reviewed with the patient and appropriate educational materials and side effects teaching were provided. Gastrointestinal Bleeding: Care Instructions  Your Care Instructions    The digestive or gastrointestinal tract goes from the mouth to the anus. It is often called the GI tract. Bleeding can happen anywhere in the GI tract. It may be caused by an ulcer, an infection, or cancer. It may also be caused by medicines such as aspirin or ibuprofen. Light bleeding may not cause any symptoms at first. But if you continue to bleed for a while, you may feel very weak or tired. Sudden, heavy bleeding means you need to see a doctor right away. This kind of bleeding can be very dangerous. But it can usually be cured or controlled. The doctor may do some tests to find the cause of your bleeding. Follow-up care is a key part of your treatment and safety. Be sure to make and go to all appointments, and call your doctor if you are having problems. It's also a good idea to know your test results and keep a list of the medicines you take. How can you care for yourself at home? · Be safe with medicines. Take your medicines exactly as prescribed. Call your doctor if you think you are having a problem with your medicine. You will get more details on the specific medicines your doctor prescribes. · Do not take aspirin or other anti-inflammatory medicines, such as naproxen (Aleve) or ibuprofen (Advil, Motrin), without talking to your doctor first. Ask your doctor if it is okay to use acetaminophen (Tylenol). · Do not drink alcohol. · The bleeding may make you lose iron. So it's important to eat foods that have a lot of iron. These include red meat, shellfish, poultry, and eggs. They also include beans, raisins, whole-grain breads, and leafy green vegetables. If you want help planning meals, you can make an appointment with a dietitian. When should you call for help? Call 911 anytime you think you may need emergency care. For example, call if:  · You have sudden, severe belly pain.   · You vomit blood or what looks like coffee grounds. · You passed out (lost consciousness). · Your stools are maroon or very bloody. Call your doctor now or seek immediate medical care if:  · You are dizzy or lightheaded, or you feel like you may faint. · Your stools are black and look like tar, or they have streaks of blood. · You have belly pain. · You vomit or have nausea. · You have trouble swallowing, or it hurts when you swallow. Watch closely for changes in your health, and be sure to contact your doctor if:  · You do not get better as expected. Where can you learn more? Go to http://lucero-mahogany.info/. Enter G970 in the search box to learn more about \"Gastrointestinal Bleeding: Care Instructions. \"  Current as of: May 27, 2016  Content Version: 11.2  © 0267-2474 [employer]. Care instructions adapted under license by [employer] (which disclaims liability or warranty for this information). If you have questions about a medical condition or this instruction, always ask your healthcare professional. [address] any warranty or liability for your use of this information.

## 2017-03-31 NOTE — DISCHARGE SUMMARY
Hospitalist Discharge Summary     Patient ID:  Ashley Mendez  [id number]  79 y.o.  12/14/1926  Admit date: 3/29/2017  3:03 PM  Discharge date and time: 3/31/2017  Attending: Romi Lema MD  PCP:  Zoe Lino DO  Treatment Team: Attending Provider: Romi Lema MD; Utilization Review: Vamsi Uriarte; Primary Nurse: Javid Mcclure RN; Care Manager: BRINDA Garrido    Principal Diagnosis Diverticulosis of intestine with bleeding   Hospital Problems as of 3/31/2017  Date Reviewed: 3/29/2017          Codes Class Noted - Resolved POA    * (Principal)Diverticulosis of intestine with bleeding ICD-10-CM: K57.91  ICD-9-CM: 562.12  3/31/2017 - Present Unknown        GI bleed ICD-10-CM: K92.2  ICD-9-CM: 578.9  3/29/2017 - Present Unknown              HPI: 80 y.o. female that presents as direct admission to the hospitalist service from Dr [facility] due to GI bleed/melena with grossly heme positive stool. She reports 2 day history of melena, N/V associated with abdominal cramps. Emesis described as green \"bile like\" without hematemesis or coffee ground emesis. She was seen in the ED yesterday with Hgb of 11.2, she was given IVF and discharged home. BP has been well controlled without dyazide, so holding it at discharge. Restart if needed per PCP. Hospital Course: Admitted 3/30 with recurrent GIB. Resolved spontaneously. No further BMs. Nausea and vomiting also resolved. Patient did not want to pursue EGD unless absolutely necessary. Hgb stabilized and no further signs of bleeding, so will discharge her home with GI follow up as needed.        Significant Diagnostic Studies:   Labs: Results:       Chemistry Recent Labs      03/29/17   1510  03/28/17   1530   GLU  73  106*   NA  141  143   K  3.6  3.9   CL  106  108*   CO2  24  28   BUN  29*  21   CREA  1.73*  1.63*   CA  7.8*  8.3   AGAP  11  7   AP   --   58   TP   --   6.8   ALB   --   3.4   GLOB   --   3.4   AGRAT   --   1.0* CBC w/Diff Recent Labs      03/31/17   1101  03/31/17   0020  03/30/17   1500   03/28/17   1530   WBC   --    --    --    --   6.6   RBC   --    --    --    --   3.95*   HGB  10.2*  9.5*  11.2*   < >  11.2*   HCT  31.8*  29.6*  34.6*   < >  34.5*   PLT   --    --    --    --   180   GRANS   --    --    --    --   85*   LYMPH   --    --    --    --   9*   EOS   --    --    --    --   0*    < > = values in this interval not displayed. Cardiac Enzymes No results for input(s): CPK, CKND1, CHRISTINE in the last 72 hours. No lab exists for component: CKRMB, TROIP   Coagulation No results for input(s): PTP, INR, APTT in the last 72 hours. No lab exists for component: INREXT, INREXT    Lipid Panel No results found for: CHOL, CHOLPOCT, CHOLX, CHLST, CHOLV, [id number], HDL, LDL, NLDLCT, DLDL, LDLC, DLDLP, [id number], VLDLC, VLDL, TGL, TGLX, TRIGL, [id number], TRIGP, TGLPOCT, [id number], CHHD, CHHDX   BNP No results for input(s): BNPP in the last 72 hours. Liver Enzymes Recent Labs      03/28/17   1530   TP  6.8   ALB  3.4   AP  58   SGOT  13*      Thyroid Studies Lab Results   Component Value Date/Time    TSH 0.303 03/08/2017 04:49 PM            Discharge Exam:  Visit Vitals    /64    Pulse 68    Temp 98.2 °F (36.8 °C)    Resp 15    SpO2 98%     General appearance: alert, cooperative, NAD  Lungs: clear to auscultation bilaterally  Heart: regular rate and rhythm  Abdomen: soft, NTTP, +BS  Extremities: no cyanosis or edema  Neurologic: Grossly normal    Disposition: home  Discharge Condition: stable  Patient Instructions:   Current Discharge Medication List      CONTINUE these medications which have NOT CHANGED    Details   potassium chloride SR (KLOR-CON 10) 10 mEq tablet Take 10 mEq by mouth two (2) times a day. hyoscyamine SL (LEVSIN/SL) 0.125 mg SL tablet 1 Tab by SubLINGual route every four (4) hours as needed for Cramping. Qty: 15 Tab, Refills: 0      atenolol (TENORMIN) 50 mg tablet Take  by mouth daily. levothyroxine (SYNTHROID) 112 mcg tablet Take  by mouth Daily (before breakfast). lisinopril (PRINIVIL, ZESTRIL) 20 mg tablet Take  by mouth daily. senna (SENNA) 8.6 mg tablet Take 1 Tab by mouth daily. raNITIdine (ZANTAC) 150 mg tablet Take 150 mg by mouth two (2) times a day. acetaminophen (TYLENOL) 325 mg tablet Take  by mouth every four (4) hours as needed for Pain.          STOP taking these medications       triamterene-hydroCHLOROthiazide (DYAZIDE) 37.5-25 mg per capsule Comments:   Reason for Stopping:         ondansetron (ZOFRAN ODT) 4 mg disintegrating tablet Comments:   Reason for Stopping:         mirtazapine (REMERON) 7.5 mg tablet Comments:   Reason for Stopping:               Activity: Activity as tolerated  Diet: Cardiac Diet    Follow-up  -   PCP in 1 week  -   GI as needed    Signed:  Patricia Hall MD  3/31/2017  1:31 PM

## 2017-03-31 NOTE — PROGRESS NOTES
Serial hgb d/c'd this am. Pt's family requesting hgb this am. Call to Dr. Lazaro Mckinney, new order received for H/H one time.

## 2017-03-31 NOTE — PROGRESS NOTES
Care Management Interventions  PCP Verified by CM: Yes  Transition of Care Consult (CM Consult): [address]: Yes  Physical Therapy Consult: Yes  Current Support Network: Lives with Caregiver (Lives with son and daughter in law. )  Confirm Follow Up Transport: Family  Plan discussed with Pt/Family/Caregiver: Yes  Freedom of Choice Offered: Yes  Discharge Location  Discharge Placement: Home with home health     DC to home with [facility]; PT and Nursing. Transition for [facility] when appropriate. (Family choice and [facility] is following).    BRINDA Gaxiola

## 2017-03-31 NOTE — PROGRESS NOTES
Discharge instructions and prescriptions given and reviewed with pt and family, verbalizes understanding, medication side effect sheet reviewed with pt, pt discharged home with family.

## 2017-04-01 ENCOUNTER — HOME CARE VISIT (OUTPATIENT)
Dept: SCHEDULING | Facility: HOME HEALTH | Age: 82
End: 2017-04-01
Payer: MEDICARE

## 2017-04-01 PROCEDURE — G0299 HHS/HOSPICE OF RN EA 15 MIN: HCPCS

## 2017-04-01 PROCEDURE — 3331090001 HH PPS REVENUE CREDIT

## 2017-04-01 PROCEDURE — 400013 HH SOC

## 2017-04-01 PROCEDURE — 3331090002 HH PPS REVENUE DEBIT

## 2017-04-02 VITALS
TEMPERATURE: 97.3 F | HEIGHT: 69 IN | SYSTOLIC BLOOD PRESSURE: 132 MMHG | BODY MASS INDEX: 20.73 KG/M2 | HEART RATE: 70 BPM | DIASTOLIC BLOOD PRESSURE: 72 MMHG | RESPIRATION RATE: 19 BRPM | WEIGHT: 140 LBS

## 2017-04-02 PROCEDURE — 3331090002 HH PPS REVENUE DEBIT

## 2017-04-02 PROCEDURE — 3331090001 HH PPS REVENUE CREDIT

## 2017-04-03 PROCEDURE — 3331090002 HH PPS REVENUE DEBIT

## 2017-04-03 PROCEDURE — 3331090001 HH PPS REVENUE CREDIT

## 2017-04-04 ENCOUNTER — HOME CARE VISIT (OUTPATIENT)
Dept: SCHEDULING | Facility: HOME HEALTH | Age: 82
End: 2017-04-04
Payer: MEDICARE

## 2017-04-04 PROCEDURE — 3331090002 HH PPS REVENUE DEBIT

## 2017-04-04 PROCEDURE — 3331090001 HH PPS REVENUE CREDIT

## 2017-04-04 PROCEDURE — G0299 HHS/HOSPICE OF RN EA 15 MIN: HCPCS

## 2017-04-04 NOTE — PROGRESS NOTES
[address]  Face to Face Encounter    Patients Name: Bing Manning    YOB: 1926    Ordering Physician: Lyudmila    Primary Diagnosis: Anemia, unspecified type [D64.9]  Melena [K92.1]    Date of Face to Face:   3/31/17                                Face to Face Encounter findings are related to primary reason for home care:   yes. 1. I certify that the patient needs intermittent care as follows: skilled nursing care:  skilled observation/assessment, patient education  physical therapy: strengthening. aid    2. I certify that this patient is homebound, that is: 1) patient requires the use of a walker device, special transportation, or assistance of another to leave the home; or 2) patient's condition makes leaving the home medically contraindicated; and 3) patient has a normal inability to leave the home and leaving the home requires considerable and taxing effort. Patient may leave the home for infrequent and short duration for medical reasons, and occasional absences for non-medical reasons. Homebound status is due to the following functional limitations: Patient with strength deficits limiting the performance of all ADL's without caregiver assistance or the use of an assistive device. 3. I certify that this patient is under my care and that I, or a nurse practitioner or [facility], or clinical nurse specialist, or certified nurse midwife, working with me, had a Face-to-Face Encounter that meets the physician Face-to-Face Encounter requirements.   The following are the clinical findings from the [address] [place] [address] encounter that support the need for skilled services and is a summary of the encounter:     See discharge summary/hospital chart      [facility]  4/4/2017      THE FOLLOWING TO BE COMPLETED BY THE COMMUNITY PHYSICIAN:    I concur with the findings described above from the [facility] encounter that this patient is homebound and in need of a skilled service.     Certifying Physician: _____________________________________      Printed Certifying Physician Name: _____________________________________    Date: _________________

## 2017-04-05 ENCOUNTER — HOME CARE VISIT (OUTPATIENT)
Dept: HOME HEALTH SERVICES | Facility: HOME HEALTH | Age: 82
End: 2017-04-05
Payer: MEDICARE

## 2017-04-05 ENCOUNTER — HOME CARE VISIT (OUTPATIENT)
Dept: SCHEDULING | Facility: HOME HEALTH | Age: 82
End: 2017-04-05
Payer: MEDICARE

## 2017-04-05 VITALS
HEART RATE: 68 BPM | SYSTOLIC BLOOD PRESSURE: 140 MMHG | DIASTOLIC BLOOD PRESSURE: 68 MMHG | RESPIRATION RATE: 16 BRPM | OXYGEN SATURATION: 99 %

## 2017-04-05 VITALS — HEIGHT: 69 IN | BODY MASS INDEX: 20.73 KG/M2 | WEIGHT: 140 LBS

## 2017-04-05 VITALS
RESPIRATION RATE: 18 BRPM | OXYGEN SATURATION: 92 % | HEART RATE: 86 BPM | DIASTOLIC BLOOD PRESSURE: 66 MMHG | SYSTOLIC BLOOD PRESSURE: 138 MMHG

## 2017-04-05 PROCEDURE — G0151 HHCP-SERV OF PT,EA 15 MIN: HCPCS

## 2017-04-05 PROCEDURE — 3331090002 HH PPS REVENUE DEBIT

## 2017-04-05 PROCEDURE — 3331090001 HH PPS REVENUE CREDIT

## 2017-04-06 PROCEDURE — 3331090002 HH PPS REVENUE DEBIT

## 2017-04-06 PROCEDURE — 3331090001 HH PPS REVENUE CREDIT

## 2017-04-07 ENCOUNTER — HOME CARE VISIT (OUTPATIENT)
Dept: SCHEDULING | Facility: HOME HEALTH | Age: 82
End: 2017-04-07
Payer: MEDICARE

## 2017-04-07 PROBLEM — R41.89 COGNITIVE DEFICITS: Status: ACTIVE | Noted: 2017-04-07

## 2017-04-07 PROCEDURE — 3331090002 HH PPS REVENUE DEBIT

## 2017-04-07 PROCEDURE — G0299 HHS/HOSPICE OF RN EA 15 MIN: HCPCS

## 2017-04-07 PROCEDURE — 3331090001 HH PPS REVENUE CREDIT

## 2017-04-08 PROCEDURE — 3331090001 HH PPS REVENUE CREDIT

## 2017-04-08 PROCEDURE — 3331090002 HH PPS REVENUE DEBIT

## 2017-04-09 PROCEDURE — 3331090001 HH PPS REVENUE CREDIT

## 2017-04-09 PROCEDURE — 3331090002 HH PPS REVENUE DEBIT

## 2017-04-10 VITALS
HEART RATE: 70 BPM | RESPIRATION RATE: 16 BRPM | DIASTOLIC BLOOD PRESSURE: 72 MMHG | SYSTOLIC BLOOD PRESSURE: 132 MMHG | TEMPERATURE: 97.9 F | OXYGEN SATURATION: 99 %

## 2017-04-10 PROCEDURE — 3331090002 HH PPS REVENUE DEBIT

## 2017-04-10 PROCEDURE — 3331090001 HH PPS REVENUE CREDIT

## 2017-04-11 ENCOUNTER — HOME CARE VISIT (OUTPATIENT)
Dept: SCHEDULING | Facility: HOME HEALTH | Age: 82
End: 2017-04-11
Payer: MEDICARE

## 2017-04-11 VITALS
OXYGEN SATURATION: 96 % | DIASTOLIC BLOOD PRESSURE: 60 MMHG | HEART RATE: 60 BPM | TEMPERATURE: 98.3 F | RESPIRATION RATE: 18 BRPM | SYSTOLIC BLOOD PRESSURE: 120 MMHG

## 2017-04-11 PROCEDURE — G0157 HHC PT ASSISTANT EA 15: HCPCS

## 2017-04-11 PROCEDURE — 3331090001 HH PPS REVENUE CREDIT

## 2017-04-11 PROCEDURE — 3331090002 HH PPS REVENUE DEBIT

## 2017-04-12 ENCOUNTER — HOME CARE VISIT (OUTPATIENT)
Dept: SCHEDULING | Facility: HOME HEALTH | Age: 82
End: 2017-04-12
Payer: MEDICARE

## 2017-04-12 VITALS
TEMPERATURE: 98 F | RESPIRATION RATE: 16 BRPM | OXYGEN SATURATION: 95 % | SYSTOLIC BLOOD PRESSURE: 132 MMHG | HEART RATE: 68 BPM | DIASTOLIC BLOOD PRESSURE: 64 MMHG

## 2017-04-12 PROCEDURE — G0299 HHS/HOSPICE OF RN EA 15 MIN: HCPCS

## 2017-04-12 PROCEDURE — 3331090002 HH PPS REVENUE DEBIT

## 2017-04-12 PROCEDURE — 3331090001 HH PPS REVENUE CREDIT

## 2017-04-13 PROCEDURE — 3331090002 HH PPS REVENUE DEBIT

## 2017-04-13 PROCEDURE — 3331090001 HH PPS REVENUE CREDIT

## 2017-04-14 ENCOUNTER — HOME CARE VISIT (OUTPATIENT)
Dept: SCHEDULING | Facility: HOME HEALTH | Age: 82
End: 2017-04-14
Payer: MEDICARE

## 2017-04-14 PROCEDURE — 3331090002 HH PPS REVENUE DEBIT

## 2017-04-14 PROCEDURE — G0157 HHC PT ASSISTANT EA 15: HCPCS

## 2017-04-14 PROCEDURE — 3331090001 HH PPS REVENUE CREDIT

## 2017-04-15 PROCEDURE — 3331090002 HH PPS REVENUE DEBIT

## 2017-04-15 PROCEDURE — 3331090001 HH PPS REVENUE CREDIT

## 2017-04-16 VITALS
HEART RATE: 78 BPM | SYSTOLIC BLOOD PRESSURE: 120 MMHG | DIASTOLIC BLOOD PRESSURE: 76 MMHG | TEMPERATURE: 97 F | RESPIRATION RATE: 17 BRPM

## 2017-04-16 PROCEDURE — 3331090002 HH PPS REVENUE DEBIT

## 2017-04-16 PROCEDURE — 3331090001 HH PPS REVENUE CREDIT

## 2017-04-17 ENCOUNTER — HOME CARE VISIT (OUTPATIENT)
Dept: HOME HEALTH SERVICES | Facility: HOME HEALTH | Age: 82
End: 2017-04-17
Payer: MEDICARE

## 2017-04-17 PROCEDURE — 3331090002 HH PPS REVENUE DEBIT

## 2017-04-17 PROCEDURE — 3331090001 HH PPS REVENUE CREDIT

## 2017-04-18 ENCOUNTER — HOME CARE VISIT (OUTPATIENT)
Dept: SCHEDULING | Facility: HOME HEALTH | Age: 82
End: 2017-04-18
Payer: MEDICARE

## 2017-04-18 VITALS
HEART RATE: 64 BPM | SYSTOLIC BLOOD PRESSURE: 140 MMHG | DIASTOLIC BLOOD PRESSURE: 70 MMHG | OXYGEN SATURATION: 94 % | RESPIRATION RATE: 18 BRPM | TEMPERATURE: 98.3 F

## 2017-04-18 PROBLEM — F01.50 VASCULAR DEMENTIA WITHOUT BEHAVIORAL DISTURBANCE (HCC): Status: ACTIVE | Noted: 2017-04-18

## 2017-04-18 PROCEDURE — 3331090002 HH PPS REVENUE DEBIT

## 2017-04-18 PROCEDURE — G0157 HHC PT ASSISTANT EA 15: HCPCS

## 2017-04-18 PROCEDURE — 3331090001 HH PPS REVENUE CREDIT

## 2017-04-19 ENCOUNTER — HOME CARE VISIT (OUTPATIENT)
Dept: SCHEDULING | Facility: HOME HEALTH | Age: 82
End: 2017-04-19
Payer: MEDICARE

## 2017-04-19 PROCEDURE — G0299 HHS/HOSPICE OF RN EA 15 MIN: HCPCS

## 2017-04-19 PROCEDURE — 3331090001 HH PPS REVENUE CREDIT

## 2017-04-19 PROCEDURE — 3331090002 HH PPS REVENUE DEBIT

## 2017-04-20 ENCOUNTER — HOME CARE VISIT (OUTPATIENT)
Dept: SCHEDULING | Facility: HOME HEALTH | Age: 82
End: 2017-04-20
Payer: MEDICARE

## 2017-04-20 PROCEDURE — 3331090001 HH PPS REVENUE CREDIT

## 2017-04-20 PROCEDURE — 3331090002 HH PPS REVENUE DEBIT

## 2017-04-20 PROCEDURE — G0157 HHC PT ASSISTANT EA 15: HCPCS

## 2017-04-21 PROCEDURE — 3331090002 HH PPS REVENUE DEBIT

## 2017-04-21 PROCEDURE — 3331090001 HH PPS REVENUE CREDIT

## 2017-04-22 PROCEDURE — 3331090001 HH PPS REVENUE CREDIT

## 2017-04-22 PROCEDURE — 3331090002 HH PPS REVENUE DEBIT

## 2017-04-23 VITALS
OXYGEN SATURATION: 97 % | DIASTOLIC BLOOD PRESSURE: 62 MMHG | SYSTOLIC BLOOD PRESSURE: 130 MMHG | HEART RATE: 64 BPM | TEMPERATURE: 97.7 F

## 2017-04-23 PROCEDURE — 3331090002 HH PPS REVENUE DEBIT

## 2017-04-23 PROCEDURE — 3331090001 HH PPS REVENUE CREDIT

## 2017-04-24 VITALS
SYSTOLIC BLOOD PRESSURE: 142 MMHG | HEART RATE: 68 BPM | TEMPERATURE: 97.7 F | DIASTOLIC BLOOD PRESSURE: 88 MMHG | OXYGEN SATURATION: 99 % | RESPIRATION RATE: 20 BRPM

## 2017-04-24 PROCEDURE — 3331090001 HH PPS REVENUE CREDIT

## 2017-04-24 PROCEDURE — 3331090002 HH PPS REVENUE DEBIT

## 2017-04-25 ENCOUNTER — HOME CARE VISIT (OUTPATIENT)
Dept: SCHEDULING | Facility: HOME HEALTH | Age: 82
End: 2017-04-25
Payer: MEDICARE

## 2017-04-25 VITALS
TEMPERATURE: 98 F | HEART RATE: 67 BPM | DIASTOLIC BLOOD PRESSURE: 60 MMHG | OXYGEN SATURATION: 99 % | SYSTOLIC BLOOD PRESSURE: 130 MMHG | RESPIRATION RATE: 18 BRPM

## 2017-04-25 PROCEDURE — 3331090001 HH PPS REVENUE CREDIT

## 2017-04-25 PROCEDURE — G0157 HHC PT ASSISTANT EA 15: HCPCS

## 2017-04-25 PROCEDURE — 3331090002 HH PPS REVENUE DEBIT

## 2017-04-26 PROCEDURE — 3331090002 HH PPS REVENUE DEBIT

## 2017-04-26 PROCEDURE — 3331090001 HH PPS REVENUE CREDIT

## 2017-04-27 ENCOUNTER — HOME CARE VISIT (OUTPATIENT)
Dept: SCHEDULING | Facility: HOME HEALTH | Age: 82
End: 2017-04-27
Payer: MEDICARE

## 2017-04-27 PROCEDURE — G0299 HHS/HOSPICE OF RN EA 15 MIN: HCPCS

## 2017-04-27 PROCEDURE — 3331090001 HH PPS REVENUE CREDIT

## 2017-04-27 PROCEDURE — 3331090002 HH PPS REVENUE DEBIT

## 2017-04-28 ENCOUNTER — HOME CARE VISIT (OUTPATIENT)
Dept: SCHEDULING | Facility: HOME HEALTH | Age: 82
End: 2017-04-28
Payer: MEDICARE

## 2017-04-28 VITALS
DIASTOLIC BLOOD PRESSURE: 68 MMHG | RESPIRATION RATE: 16 BRPM | SYSTOLIC BLOOD PRESSURE: 104 MMHG | TEMPERATURE: 97.5 F | HEART RATE: 72 BPM | OXYGEN SATURATION: 98 %

## 2017-04-28 PROCEDURE — 3331090002 HH PPS REVENUE DEBIT

## 2017-04-28 PROCEDURE — 3331090001 HH PPS REVENUE CREDIT

## 2017-04-28 PROCEDURE — G0157 HHC PT ASSISTANT EA 15: HCPCS

## 2017-04-29 PROCEDURE — 3331090002 HH PPS REVENUE DEBIT

## 2017-04-29 PROCEDURE — 3331090001 HH PPS REVENUE CREDIT

## 2017-04-30 VITALS
OXYGEN SATURATION: 98 % | RESPIRATION RATE: 18 BRPM | DIASTOLIC BLOOD PRESSURE: 80 MMHG | SYSTOLIC BLOOD PRESSURE: 140 MMHG | HEART RATE: 87 BPM

## 2017-04-30 PROCEDURE — 3331090002 HH PPS REVENUE DEBIT

## 2017-04-30 PROCEDURE — 3331090001 HH PPS REVENUE CREDIT

## 2017-05-01 ENCOUNTER — HOME CARE VISIT (OUTPATIENT)
Dept: SCHEDULING | Facility: HOME HEALTH | Age: 82
End: 2017-05-01
Payer: MEDICARE

## 2017-05-01 PROCEDURE — 3331090002 HH PPS REVENUE DEBIT

## 2017-05-01 PROCEDURE — G0157 HHC PT ASSISTANT EA 15: HCPCS

## 2017-05-01 PROCEDURE — 3331090001 HH PPS REVENUE CREDIT

## 2017-05-02 VITALS
RESPIRATION RATE: 18 BRPM | DIASTOLIC BLOOD PRESSURE: 70 MMHG | SYSTOLIC BLOOD PRESSURE: 130 MMHG | OXYGEN SATURATION: 98 % | TEMPERATURE: 98.3 F | HEART RATE: 68 BPM

## 2017-05-02 PROCEDURE — 3331090001 HH PPS REVENUE CREDIT

## 2017-05-02 PROCEDURE — 3331090002 HH PPS REVENUE DEBIT

## 2017-05-03 PROCEDURE — 3331090002 HH PPS REVENUE DEBIT

## 2017-05-03 PROCEDURE — 3331090001 HH PPS REVENUE CREDIT

## 2017-05-04 ENCOUNTER — HOME CARE VISIT (OUTPATIENT)
Dept: SCHEDULING | Facility: HOME HEALTH | Age: 82
End: 2017-05-04
Payer: MEDICARE

## 2017-05-04 VITALS
RESPIRATION RATE: 18 BRPM | TEMPERATURE: 96.5 F | HEART RATE: 68 BPM | DIASTOLIC BLOOD PRESSURE: 80 MMHG | SYSTOLIC BLOOD PRESSURE: 128 MMHG

## 2017-05-04 PROCEDURE — 3331090001 HH PPS REVENUE CREDIT

## 2017-05-04 PROCEDURE — G0151 HHCP-SERV OF PT,EA 15 MIN: HCPCS

## 2017-05-04 PROCEDURE — 3331090003 HH PPS REVENUE ADJ

## 2017-05-04 PROCEDURE — 3331090002 HH PPS REVENUE DEBIT

## 2017-05-05 PROCEDURE — 3331090002 HH PPS REVENUE DEBIT

## 2017-05-05 PROCEDURE — 3331090001 HH PPS REVENUE CREDIT

## 2017-05-06 PROCEDURE — 3331090001 HH PPS REVENUE CREDIT

## 2017-05-06 PROCEDURE — 3331090002 HH PPS REVENUE DEBIT

## 2017-10-08 NOTE — IP AVS SNAPSHOT
[address] 
[phone] Patient: Karyna Ayala MRN: [id number] You are allergic to the following Allergen Reactions Pcn (Penicillins) Anaphylaxis Sulfa (Sulfonamide Antibiotics) Anaphylaxis Recent Documentation OB Status Smoking Status Postmenopausal Never Smoker Emergency Contacts Name Discharge Info Relation Home Work Mobile Helga Palomo  Other Relative [6] [phone] Adolfo Palomo  Son [22] [phone] About your hospitalization You were admitted on:  2017 You last received care in the:  [facility] 6 MED SURG You were discharged on:  2017 Unit phone number:  [phone] Why you were hospitalized Your primary diagnosis was:  Diverticulosis Of Intestine With Bleeding Your diagnoses also included:  Gi Bleed Providers Seen During Your Hospitalizations Provider Role Specialty Primary office phone Whitney Jean MD Attending Provider Internal Medicine [phone] Your Primary Care Physician (PCP) Primary Care Physician Office Phone Office Fax Tor Stewart [phone] Follow-up Information Follow up With Details Comments Contact Info Sima Tam DO  office closed for the day. patient family will need to call and schedule. [address] [facility] [address] [phone] Current Discharge Medication List  
  
CONTINUE these medications which have NOT CHANGED Dose & Instructions Dispensing Information Comments Morning Noon Evening Bedtime  
 atenolol 50 mg tablet Commonly known as:  TENORMIN Your next dose is:  4/1 Take  by mouth daily. Refills:  0  
     
   
   
   
  
 hyoscyamine SL 0.125 mg SL tablet Commonly known as:  LEVSIN/SL  
 Your next dose is:  As needed Dose:  0.125 mg  
1 Tab by SubLINGual route every four (4) hours as needed for Cramping. Quantity:  15 Tab Refills:  0 KLOR-CON 10 10 mEq tablet Generic drug:  potassium chloride SR Your next dose is: Today with supper Dose:  10 mEq Take 10 mEq by mouth two (2) times a day. Refills:  0  
     
   
   
  
   
  
 levothyroxine 112 mcg tablet Commonly known as:  SYNTHROID Your next dose is:  4/1 Take  by mouth Daily (before breakfast). Refills:  0  
     
   
   
   
  
 lisinopril 20 mg tablet Commonly known as:  Karene Bolls Your next dose is:  4/1 Take  by mouth daily. Refills:  0 Senna 8.6 mg tablet Generic drug:  senna Your next dose is:  4/1 Dose:  1 Tab Take 1 Tab by mouth daily. Refills:  0  
     
   
   
   
  
 TYLENOL 325 mg tablet Generic drug:  acetaminophen Your next dose is:  As needed Take  by mouth every four (4) hours as needed for Pain. Refills:  0  
     
   
   
   
  
 ZANTAC 150 mg tablet Generic drug:  raNITIdine Your next dose is: Today before supper Dose:  150 mg Take 150 mg by mouth two (2) times a day. Refills:  0 STOP taking these medications DYAZIDE 37.5-25 mg per capsule Generic drug:  triamterene-hydroCHLOROthiazide Discharge Instructions DISCHARGE SUMMARY from Nurse The following personal items are in your possession at time of discharge: 
 
  
Visual Aid: Glasses Home Medications: None Jewelry: None Clothing: Footwear, Pants, Shirt, Socks, Undergarments Other Valuables: At bedside, Tavares [place] PATIENT INSTRUCTIONS: 
 
 
F-face looks uneven A-arms unable to move or move unevenly S-speech slurred or non-existent T-time-call 911 as soon as signs and symptoms begin-DO NOT go Back to bed or wait to see if you get better-TIME IS BRAIN. Warning Signs of HEART ATTACK Call 911 if you have these symptoms: 
? Chest discomfort. Most heart attacks involve discomfort in the center of the chest that lasts more than a few minutes, or that goes away and comes back. It can feel like uncomfortable pressure, squeezing, fullness, or pain. ? Discomfort in other areas of the upper body. Symptoms can include pain or discomfort in one or both arms, the back, neck, jaw, or stomach. ? Shortness of breath with or without chest discomfort. ? Other signs may include breaking out in a cold sweat, nausea, or lightheadedness. Don't wait more than five minutes to call [address]! Fast action can save your life. Calling 911 is almost always the fastest way to get lifesaving treatment. Emergency Medical Services staff can begin treatment when they arrive  up to an hour sooner than if someone gets to the hospital by car. The discharge information has been reviewed with the patient. The patient verbalized understanding. Discharge medications reviewed with the patient and appropriate educational materials and side effects teaching were provided. Gastrointestinal Bleeding: Care Instructions Your Care Instructions The digestive or gastrointestinal tract goes from the mouth to the anus. It is often called the GI tract. Bleeding can happen anywhere in the GI tract. It may be caused by an ulcer, an infection, or cancer. It may also be caused by medicines such as aspirin or ibuprofen.  
Light bleeding may not cause any symptoms at first. But if you continue to bleed for a while, you may feel very weak or tired. Sudden, heavy bleeding means you need to see a doctor right away. This kind of bleeding can be very dangerous. But it can usually be cured or controlled. The doctor may do some tests to find the cause of your bleeding. Follow-up care is a key part of your treatment and safety. Be sure to make and go to all appointments, and call your doctor if you are having problems. It's also a good idea to know your test results and keep a list of the medicines you take. How can you care for yourself at home? · Be safe with medicines. Take your medicines exactly as prescribed. Call your doctor if you think you are having a problem with your medicine. You will get more details on the specific medicines your doctor prescribes. · Do not take aspirin or other anti-inflammatory medicines, such as naproxen (Aleve) or ibuprofen (Advil, Motrin), without talking to your doctor first. Ask your doctor if it is okay to use acetaminophen (Tylenol). · Do not drink alcohol. · The bleeding may make you lose iron. So it's important to eat foods that have a lot of iron. These include red meat, shellfish, poultry, and eggs. They also include beans, raisins, whole-grain breads, and leafy green vegetables. If you want help planning meals, you can make an appointment with a dietitian. When should you call for help? Call 911 anytime you think you may need emergency care. For example, call if: 
· You have sudden, severe belly pain. · You vomit blood or what looks like coffee grounds. · You passed out (lost consciousness). · Your stools are maroon or very bloody. Call your doctor now or seek immediate medical care if: 
· You are dizzy or lightheaded, or you feel like you may faint. · Your stools are black and look like tar, or they have streaks of blood. · You have belly pain. · You vomit or have nausea. · You have trouble swallowing, or it hurts when you swallow. Watch closely for changes in your health, and be sure to contact your doctor if: 
· You do not get better as expected. Where can you learn more? Go to http://lucero-mahogany.info/. Enter V216 in the search box to learn more about \"Gastrointestinal Bleeding: Care Instructions. \" Current as of: May 27, 2016 Content Version: 11.2 © 4318-2799 [employer]. Care instructions adapted under license by [employer] (which disclaims liability or warranty for this information). If you have questions about a medical condition or this instruction, always ask your healthcare professional. [place] [address] any warranty or liability for your use of this information. Discharge Orders None "[employer] Announcement We are excited to announce that we are making your provider's discharge notes available to you in "[employer]. You will see these notes when they are completed and signed by the physician that discharged you from your recent hospital stay. If you have any questions or concerns about any information you see in "[employer] please call the Health Information Department where you were seen or reach out to your Primary Care Provider for more information about your plan of care. Introducing [facility] [place] [facility]! Shannon Carbajal introduces "[employer] patient portal. Now you can access parts of your medical record, email your doctor's office, and request medication refills online. 1. In your internet browser, go to [email or web address] 2. Click on the First Time User? Click Here link in the Sign In box. You will see the New Member Sign Up page. 3. Enter your "[employer] Access Code exactly as it appears below. You will not need to use this code after youve completed the sign-up process. If you do not sign up before the expiration date, you must request a new code. · "[employer] Access Code: [email or web address] Expires: 6/18/2017  3:40 PM 
 
 4. Enter the last four digits of your Social Security Number (xxxx) and Date of Birth (mm/dd/yyyy) as indicated and click Submit. You will be taken to the next sign-up page. 5. Create a [employer] ID. This will be your [employer] login ID and cannot be changed, so think of one that is secure and easy to remember. 6. Create a [employer] password. You can change your password at any time. 7. Enter your Password Reset Question and Answer. This can be used at a later time if you forget your password. 8. Enter your e-mail address. You will receive e-mail notification when new information is available in [address] 9. Click Sign Up. You can now view and download portions of your medical record. 10. Click the Download Summary menu link to download a portable copy of your medical information. If you have questions, please visit the Frequently Asked Questions section of the [employer] website. Remember, [employer] is NOT to be used for urgent needs. For medical emergencies, dial 911. Now available from your iPhone and Android! General Information Please provide this summary of care documentation to your next provider. Patient Signature:  ____________________________________________________________ Date:  ____________________________________________________________  
  
Cesilia Malin Provider Signature:  ____________________________________________________________ Date:  ____________________________________________________________ Walk in